# Patient Record
Sex: MALE
[De-identification: names, ages, dates, MRNs, and addresses within clinical notes are randomized per-mention and may not be internally consistent; named-entity substitution may affect disease eponyms.]

---

## 2017-05-04 ENCOUNTER — HOSPITAL ENCOUNTER (EMERGENCY)
Dept: HOSPITAL 14 - H.ER | Age: 22
Discharge: ANOTHER HOSPITAL | End: 2017-05-04
Payer: COMMERCIAL

## 2017-05-04 VITALS
OXYGEN SATURATION: 99 % | RESPIRATION RATE: 18 BRPM | TEMPERATURE: 98 F | DIASTOLIC BLOOD PRESSURE: 62 MMHG | SYSTOLIC BLOOD PRESSURE: 129 MMHG | HEART RATE: 74 BPM

## 2017-05-04 DIAGNOSIS — M54.9: Primary | ICD-10-CM

## 2017-05-04 DIAGNOSIS — R10.9: ICD-10-CM

## 2017-05-04 NOTE — ED PDOC
HPI: Back


Time Seen by Provider: 17 19:01


Chief Complaint (Nursing): Back Pain


Chief Complaint (Provider): Back Pain


History Per: Patient


History/Exam Limitations: no limitations


Onset/Duration Of Symptoms: Hrs (prior to arrival)


Additional Complaint(s): 





Vidya Keith, 22 year old male presents to the ED on 17, with left 

flank pain. Prior to arrival, the patient bent forward to pick his bag up from 

his trunk and suddenly started to feel pain in his left flank area. He states 

that the pain worsens with movement. The patient denies hematuria, dysuria, 

incontinence, fever, trauma, chest pain, shortness of breath, or rash. 





Past Medical History


Reviewed: Historical Data, Nursing Documentation, Vital Signs


Vital Signs: 


 Last Vital Signs











Temp  98 F   17 18:31


 


Pulse  74   17 18:31


 


Resp  18   17 18:31


 


BP  129/62   17 18:31


 


Pulse Ox  99   17 18:31














- Medical History


PMH: No Chronic Diseases





- Family History


Family History: States: Unknown Family Hx





- Home Medications


Home Medications: 


 Ambulatory Orders











 Medication  Instructions  Recorded


 


Cyclobenzaprine [Cyclobenzaprine 10 mg PO Q8 PRN #30 tab 17





HCl]  


 


Naproxen [Naprosyn] 500 mg PO BID PRN #30 tab 17














- Allergies


Allergies/Adverse Reactions: 


 Allergies











Allergy/AdvReac Type Severity Reaction Status Date / Time


 


No Known Allergies Allergy   Verified 17 18:31














Review of Systems


Constitutional: Negative for: Fever, Other (no history of trauma)


Cardiovascular: Negative for: Chest Pain


Respiratory: Negative for: Shortness of Breath


Genitourinary Male: Negative for: Dysuria, Incontinence, Hematuria


Skin: Negative for: Rash





Physical Exam





- Reviewed


Nursing Documentation Reviewed: Yes


Vital Signs Reviewed: Yes





- Physical Exam


Appears: Positive for: Non-toxic.  Negative for: No Acute Distress (Mild 

Painful Distress)


Head Exam: Positive for: ATRAUMATIC, NORMOCEPHALIC


Skin: Positive for: Normal Color, Warm, Dry


Eye Exam: Positive for: Normal appearance


ENT: Positive for: Normal ENT Inspection


Neck: Positive for: Normal


Cardiovascular/Chest: Positive for: Regular Rate, Rhythm, Chest Non Tender


Respiratory: Positive for: Normal Breath Sounds.  Negative for: Respiratory 

Distress


Gastrointestinal/Abdominal: Positive for: Normal Exam, Soft.  Negative for: 

Tenderness


Back: Positive for: L CVA Tenderness, Other (left parathoracic muscle tenderness

).  Negative for: Vertebral Tenderness


Extremity: Positive for: Normal ROM.  Negative for: Deformity


Neurologic/Psych: Positive for: Alert, Oriented (x3)





- Laboratory Results


Urine dip results: Negative for: Leukocyte Esterase, Blood, Nitrate, Ketones, 

Glucose, Bilirubin, Protein





- ECG


O2 Sat by Pulse Oximetry: 99 (RA)


Pulse Ox Interpretation: Normal





- Radiology


X-Ray: Interpreted by Me (Thoracic spine x-ray)


X-Ray Interpretation: No Acute Disease





Medical Decision Making


Medical Decision Makin:01


Initial Impression:


Left Flank Pain





Initial Plan:


* Dorsal (Thoracic) Spine [RAD]


* ED Urine Dipstick (POC)


* Flexeril 10 mg PO Once


* Toradol 15 mg IM STAT


* Reevaluation








--------------------------------------------------------------------------------

----------------- 


Scribe Attestation:


Documented by Jessica Cormier, acting as a scribe for Rico Whittington PA-C.


 


Provider Scribe Attestation:


All medical record entries made by the Scribe were at my direction and 

personally dictated by me. I have reviewed the chart and agree that the record 

accurately reflects my personal performance of the history, physical exam, 

medical decision making, and the department course for this patient. I have 

also personally directed, reviewed, and agree with the discharge instructions 

and disposition.








Disposition





- Clinical Impression


Clinical Impression: 


 Back pain








- Disposition


Referrals: 


 Service [Outside]


Disposition: Routine/Home


Disposition Time: 20:17


Condition: IMPROVED


Prescriptions: 


Cyclobenzaprine [Cyclobenzaprine HCl] 10 mg PO Q8 PRN #30 tab


 PRN Reason: Muscle Spasm


Naproxen [Naprosyn] 500 mg PO BID PRN #30 tab


 PRN Reason: Pain


Instructions:  Muscle Spasm (ED)


Forms:  [facility] ED School/Work Excuse


Print Language: ENGLISH

## 2017-05-05 NOTE — RAD
HISTORY:

pain







COMPARISON:

No prior.



FINDINGS:



BONES:

No acute compression fractures nor retropulsed fragments. Vertebral 

bodies exhibit normal stature. Tube under CV and facets are normally 

aligned 



DISC SPACES:

Normal.



SOFT TISSUES:

Normal.



OTHER FINDINGS:

None.



IMPRESSION:

No acute fractures.

## 2018-01-14 ENCOUNTER — HOSPITAL ENCOUNTER (EMERGENCY)
Dept: HOSPITAL 14 - H.ER | Age: 23
Discharge: HOME | End: 2018-01-14
Payer: COMMERCIAL

## 2018-01-14 VITALS
DIASTOLIC BLOOD PRESSURE: 81 MMHG | HEART RATE: 88 BPM | TEMPERATURE: 97.5 F | RESPIRATION RATE: 18 BRPM | SYSTOLIC BLOOD PRESSURE: 134 MMHG | OXYGEN SATURATION: 100 %

## 2018-01-14 DIAGNOSIS — F10.129: Primary | ICD-10-CM

## 2018-01-14 NOTE — ED PDOC
HPI: Psych/Substance Abuse


Time Seen by Provider: 01/14/18 04:38


Chief Complaint (Nursing): Alcohol Ingestion


Chief Complaint (Provider): Alcohol Ingestion


History Per: Patient


History/Exam Limitations: no limitations


Onset/Duration Of Symptoms: Mins (just prior to arrival)


Current Symptoms Are (Timing): Still Present


Additional Complaint(s): 


24 y/o male, brought in by EMS, presents to the  for evaluation of ETOH 

intoxication. Patient denies any complaints and is requesting to be discharged 

to take an Uber home.





Past Medical History


Reviewed: Historical Data, Nursing Documentation, Vital Signs


Vital Signs: 





 Last Vital Signs











Temp  97.5 F L  01/14/18 04:47


 


Pulse  88   01/14/18 04:47


 


Resp  18   01/14/18 04:47


 


BP  134/81   01/14/18 04:47


 


Pulse Ox  100   01/14/18 04:47














- Medical History


PMH: No Chronic Diseases





- Surgical History


Surgical History: No Surg Hx





- Family History


Family History: States: Unknown Family Hx





- Social History


Current smoker - smoking cessation education provided: No


Ex-Smoker (has not smoked in the last 12 months): No


Alcohol: Social


Drugs: Denies





- Home Medications


Home Medications: 


 Ambulatory Orders











 Medication  Instructions  Recorded


 


Cyclobenzaprine [Cyclobenzaprine 10 mg PO Q8 PRN #30 tab 05/04/17





HCl]  


 


Naproxen [Naprosyn] 500 mg PO BID PRN #30 tab 05/04/17














- Allergies


Allergies/Adverse Reactions: 


 Allergies











Allergy/AdvReac Type Severity Reaction Status Date / Time


 


No Known Allergies Allergy   Verified 05/04/17 18:31














Review of Systems


ROS Statement: Except As Marked, All Systems Reviewed And Found Negative


Constitutional: Negative for: Fever


Psych: Negative for: Suicidal ideation





Physical Exam





- Reviewed


Nursing Documentation Reviewed: Yes


Vital Signs Reviewed: Yes





- Physical Exam


Appears: Positive for: Non-toxic, No Acute Distress


Head Exam: Positive for: ATRAUMATIC, NORMOCEPHALIC


Skin: Positive for: Normal Color, Warm


Eye Exam: Positive for: Normal appearance, EOMI, PERRL


Neck: Positive for: Normal


Cardiovascular/Chest: Positive for: Regular Rate, Rhythm.  Negative for: Murmur


Respiratory: Positive for: Normal Breath Sounds.  Negative for: Respiratory 

Distress


Gastrointestinal/Abdominal: Positive for: Normal Exam, Soft.  Negative for: 

Tenderness


Back: Positive for: Normal Inspection


Extremity: Positive for: Normal ROM.  Negative for: Pedal Edema, Deformity


Neurologic/Psych: Positive for: Alert, Oriented (x3, speech is clear), Gait (

steady).  Negative for: Motor/Sensory Deficits





- ECG


O2 Sat by Pulse Oximetry: 100 (RA)


Pulse Ox Interpretation: Normal





Medical Decision Making


Medical Decision Making: 


Time:


--04:47


Impression: 


--22 yo with Alcohol Use


Reassess


--05:09


Patient's speech is clear and gait is steady.


Patient is stable for discharge home. 





Scribe Attestation:


Documented by Wally Casper acting as a scribe for Anival Echavarria MD. 








Disposition





- Clinical Impression


Clinical Impression: 


 Alcohol use








- Patient ED Disposition


Is Patient to be Admitted: No





- Disposition


Disposition: Routine/Home


Disposition Time: 05:09


Condition: STABLE


Instructions:  Abuse of Alcohol (ED)